# Patient Record
Sex: MALE | Race: WHITE | Employment: FULL TIME | URBAN - METROPOLITAN AREA
[De-identification: names, ages, dates, MRNs, and addresses within clinical notes are randomized per-mention and may not be internally consistent; named-entity substitution may affect disease eponyms.]

---

## 2022-12-02 ENCOUNTER — OFFICE VISIT (OUTPATIENT)
Dept: INTERNAL MEDICINE CLINIC | Age: 47
End: 2022-12-02
Payer: COMMERCIAL

## 2022-12-02 VITALS
WEIGHT: 228.4 LBS | HEIGHT: 74 IN | BODY MASS INDEX: 29.31 KG/M2 | SYSTOLIC BLOOD PRESSURE: 102 MMHG | HEART RATE: 60 BPM | RESPIRATION RATE: 16 BRPM | DIASTOLIC BLOOD PRESSURE: 70 MMHG | OXYGEN SATURATION: 99 % | TEMPERATURE: 98.3 F

## 2022-12-02 DIAGNOSIS — Z12.11 COLON CANCER SCREENING: ICD-10-CM

## 2022-12-02 DIAGNOSIS — Z23 ENCOUNTER FOR IMMUNIZATION: ICD-10-CM

## 2022-12-02 DIAGNOSIS — Z11.59 NEED FOR HEPATITIS C SCREENING TEST: ICD-10-CM

## 2022-12-02 DIAGNOSIS — R12 HEARTBURN: Primary | ICD-10-CM

## 2022-12-02 DIAGNOSIS — Z00.00 ROUTINE PHYSICAL EXAMINATION: ICD-10-CM

## 2022-12-02 NOTE — PROGRESS NOTES
Nadiya Zendejas  is a 56 y.o. male  who present for routine immunizations. Prior to vaccine administration: Consent was obtained. Risks and adverse reactions were discussed. The patient was provided the VIS and they were given an opportunity to ask questions; all questions were addressed. He  denies any symptoms, reactions or allergies that would exclude them from being immunized today. There were no adverse reactions observed post vaccination. Patient was advised to seek medical or call the office with any questions or concerns post vaccination. Patient verbalized understanding.    Mili Barlow LPN

## 2022-12-02 NOTE — PROGRESS NOTES
12/2/2022    Nick Gutierrez Page 1975 is a 56y.o. year old male established patient,   here for evaluation of the following chief complaint(s):  Chief Complaint   Patient presents with    Establish Care    Heartburn           ASSESSMENT/PLAN:  Below is the assessment and plan developed based on review of pertinent history, physical exam, labs, studies, and medications. 1. Heartburn  Assessment & Plan:   Discussed lifestyle recs  Will get Hpylori testing now and then proceed with trial of H2 blockers, if ineffective then move to PPI  Rec that he schedule with GI to discuss having EGD at time of screening colonoscopy  Orders:  -     [address] [place] [address] Drive TEST; Future  2. Colon cancer screening  -     REFERRAL TO GASTROENTEROLOGY  3. Need for hepatitis C screening test  -     HEPATITIS C AB; Future  4. Routine physical examination  -     CBC WITH AUTOMATED DIFF; Future  -     METABOLIC PANEL, COMPREHENSIVE; Future  -     TSH 3RD GENERATION; Future  -     HEMOGLOBIN A1C WITH EAG; Future  -     LIPID PANEL; Future     Labs ordered in anticipation of physical next year    Follow-up and Dispositions    Return in about 3 months (around 3/2/2023) for annual physical, or sooner as needed. SUBJECTIVE/OBJECTIVE:  Heartburn  Some symptoms even back in his youth, had EGD in his 25s he thinks. Symptoms have been worsening last 6 months. He has taken meds off and on over the years. Can wake up from symptoms at night. Taking tums a lot or prilosec PRN occasionally. Feels like diet and exercise habits have been consistent, no obvious aggravating factors. When he stopped drinking for a while he still had reflux       Review of Systems   Constitutional:  Negative for chills and fever. HENT:          No dysphagia   Respiratory:  Negative for cough and shortness of breath. Cardiovascular:  Negative for chest pain, palpitations and leg swelling.    Gastrointestinal:  Positive for heartburn. Negative for abdominal pain. Skin:  Negative for rash. Physical Exam  Constitutional:       General: He is not in acute distress. Appearance: Normal appearance. He is not ill-appearing. HENT:      Head: Normocephalic and atraumatic. Eyes:      Conjunctiva/sclera: Conjunctivae normal.   Cardiovascular:      Rate and Rhythm: Normal rate and regular rhythm. Heart sounds: No murmur heard. Pulmonary:      Effort: Pulmonary effort is normal.      Breath sounds: Normal breath sounds. No wheezing. Musculoskeletal:      Right lower leg: No edema. Left lower leg: No edema. Skin:     General: Skin is warm and dry. Neurological:      General: No focal deficit present. Mental Status: He is alert and oriented to person, place, and time. Mental status is at baseline. Psychiatric:         Mood and Affect: Mood normal.         Thought Content: Thought content normal.         Judgment: Judgment normal.        Vitals:    12/02/22 0956   BP: 102/70   Pulse: 60   Resp: 16   Temp: 98.3 °F (36.8 °C)   TempSrc: Temporal   SpO2: 99%   Weight: 228 lb 6.4 oz (103.6 kg)   Height: 6' 1.6\" (1.869 m)        The following sections were reviewed & updated as appropriate: Problem List, Allergies, PMH, PSH, FH, and SH. Patient Active Problem List   Diagnosis Code    Heartburn R12            Patient has no known allergies. Social History     Occupational History    Not on file   Tobacco Use    Smoking status: Never    Smokeless tobacco: Never    Tobacco comments:     [place] cigar [place] - pretty rare and will use SNUS time to time   Vaping Use    Vaping Use: Never used   Substance and Sexual Activity    Alcohol use:  Yes     Alcohol/week: 7.0 standard drinks     Types: 3 Glasses of wine, 4 Cans of beer per week    Drug use: Never    Sexual activity: Yes     Partners: Female     Birth control/protection: None     Comment: 39 yo  17 years 3 kids no more kids coming            Disclaimer:  Aspects of this note may have been generated using Dragon voice recognition software. Despite editing, there may be some syntax errors   We discussed the expected course, resolution and complications of the diagnosis(es) in detail. I have discussed any significant medication side effects and warnings with the patient when indicated. I advised them to contact the office if their condition worsens, changes or fails to improve as anticipated. Patient expressed understanding of the diagnosis and plan. An electronic signature was used to authenticate this note.   -- Magaly Patel MD

## 2022-12-02 NOTE — ASSESSMENT & PLAN NOTE
Discussed lifestyle recs  Will get Hpylori testing now and then proceed with trial of H2 blockers, if ineffective then move to PPI  Rec that he schedule with GI to discuss having EGD at time of screening colonoscopy

## 2022-12-03 LAB — UREA BREATH TEST QL: NEGATIVE

## 2023-03-13 ENCOUNTER — OFFICE VISIT (OUTPATIENT)
Dept: INTERNAL MEDICINE CLINIC | Age: 48
End: 2023-03-13
Payer: COMMERCIAL

## 2023-03-13 VITALS
BODY MASS INDEX: 30.88 KG/M2 | SYSTOLIC BLOOD PRESSURE: 122 MMHG | TEMPERATURE: 97.3 F | HEIGHT: 73 IN | OXYGEN SATURATION: 98 % | RESPIRATION RATE: 18 BRPM | HEART RATE: 72 BPM | WEIGHT: 233 LBS | DIASTOLIC BLOOD PRESSURE: 82 MMHG

## 2023-03-13 DIAGNOSIS — Z00.00 ROUTINE PHYSICAL EXAMINATION: Primary | ICD-10-CM

## 2023-03-13 DIAGNOSIS — R10.9 LEFT FLANK PAIN: ICD-10-CM

## 2023-03-13 DIAGNOSIS — E78.2 MIXED HYPERLIPIDEMIA: ICD-10-CM

## 2023-03-13 DIAGNOSIS — Z23 NEED FOR TETANUS BOOSTER: ICD-10-CM

## 2023-03-13 DIAGNOSIS — K22.70 BARRETT'S ESOPHAGUS DETERMINED BY BIOPSY: ICD-10-CM

## 2023-03-13 PROCEDURE — 90715 TDAP VACCINE 7 YRS/> IM: CPT | Performed by: INTERNAL MEDICINE

## 2023-03-13 PROCEDURE — 99396 PREV VISIT EST AGE 40-64: CPT | Performed by: INTERNAL MEDICINE

## 2023-03-13 RX ORDER — OMEPRAZOLE 40 MG/1
CAPSULE, DELAYED RELEASE ORAL
COMMUNITY
Start: 2023-01-27

## 2023-03-13 NOTE — PROGRESS NOTES
3/13/2023    Lizabeth Zendejas is a 56 y.o. male who was seen in clinic today for a full physical.             Assessment & Plan:   Below is the assessment and plan developed based on review of pertinent history, physical exam, labs, studies, and medications. 1. Routine physical examination  2. Mixed hyperlipidemia  Assessment & Plan:     Lab Results   Component Value Date/Time    Cholesterol, total 179 02/06/2023 09:53 AM    HDL Cholesterol 37 (L) 02/06/2023 09:53 AM    LDL, calculated 101 (H) 02/06/2023 09:53 AM    VLDL, calculated 41 (H) 02/06/2023 09:53 AM    Triglyceride 238 (H) 02/06/2023 09:53 AM     The 10-year ASCVD risk score (Natalio DK, et al., 2019) is: 2.5%  Discussed lifestyle strategies  3. Millan's esophagus determined by biopsy  Assessment & Plan:   Recent EGD with Dr Wilkins Leventhal, will maintain PPI  4. Need for tetanus booster  -     TDAP, BOOSTRIX, (AGE 10 YRS+), IM  5. Left flank pain  -     URINALYSIS W/MICROSCOPIC; Future   Will get UA to investigate possible kidney stone though more likely MSK back pain     Previsit labs completed, WNL other than lipids    Follow-up and Dispositions    Return for annual physical, or sooner as needed.             Lizabeth Andrade is here today for a full physical.      Additional concerns today: GERD controlled now with PPI    Diet and exercise habits: working out 4-5x per week, cardio and weights  Diet overall balanced    Depression Screen:  3 most recent PHQ Screens 3/13/2023   Little interest or pleasure in doing things Not at all   Feeling down, depressed, irritable, or hopeless Not at all   Total Score PHQ 2 0       Health Maintenance:     Health Maintenance   Topic Date Due    COVID-19 Vaccine (1) Never done    DTaP/Tdap/Td series (1 - Tdap) Never done    Colorectal Cancer Screening Combo  Never done    Depression Screen  03/13/2024    Lipid Screen  02/06/2028    Hepatitis C Screening  Completed    Flu Vaccine  Completed    Pneumococcal 0-64 years  Aged Out Immunization History   Administered Date(s) Administered    Influenza, FLUARIX, FLULAVAL, FLUZONE (age 10 mo+) AND AFLURIA, (age 1 y+), PF, 0.5mL 12/02/2022         Immunizations:   Covid: not up to date - booster  Influenza: up to date  Cancer screening:     Colon: guidelines reviewed, up to date, will request records           Patient Care Team:  Pina Shaver MD as PCP - General (Internal Medicine Physician)  Pina Shaver MD as PCP - [facility]  Arlin Celis MD (Gastroenterology)  Jennifer Mcclelland MD (Orthopedic Surgery)  Troy Harris MD (Dermatology Physician)            The following sections were reviewed & updated as appropriate: Problem List, Allergies, PMH, PSH, FH, and SH. Patient Active Problem List   Diagnosis Code    Millan's esophagus determined by biopsy K22.70    Mixed hyperlipidemia E78.2        Patient has no known allergies. Social History     Occupational History    Not on file   Tobacco Use    Smoking status: Never    Smokeless tobacco: Never    Tobacco comments:     will have a cigar [place] - pretty rare and will use SNUS time to time   Vaping Use    Vaping Use: Never used   Substance and Sexual Activity    Alcohol use: Yes     Alcohol/week: 7.0 standard drinks     Types: 3 Glasses of wine, 4 Cans of beer per week    Drug use: Never    Sexual activity: Yes     Partners: Female     Birth control/protection: None     Comment: 41 yo  17 years 3 kids no more kids coming        Current Outpatient Medications   Medication Instructions    omeprazole (PRILOSEC) 40 mg capsule TAKE 1 CAPSULE BY MOUTH EVERY DAY 1/2 HOUR BEFORE BREAKFAST            Review of Systems   Constitutional:  Negative for chills and fever. Respiratory:  Negative for cough and shortness of breath. Cardiovascular:  Negative for chest pain and palpitations.    Gastrointestinal:  Negative for abdominal pain, blood in stool, constipation, diarrhea, heartburn, nausea and vomiting. Musculoskeletal:  Positive for back pain (mild left lower back) and joint pain (R posterior shoulder). Negative for myalgias. Neurological:  Negative for tingling, focal weakness and headaches. Endo/Heme/Allergies:  Does not bruise/bleed easily. Psychiatric/Behavioral:  Negative for depression. The patient is not nervous/anxious and does not have insomnia. Objective:     Physical Exam  Constitutional:       Appearance: Normal appearance. He is not ill-appearing. HENT:      Head: Normocephalic and atraumatic. Right Ear: Tympanic membrane, ear canal and external ear normal. There is no impacted cerumen. Left Ear: Tympanic membrane, ear canal and external ear normal. There is no impacted cerumen. Nose: Nose normal. No congestion. Mouth/Throat:      Mouth: Mucous membranes are moist.      Pharynx: Oropharynx is clear. No oropharyngeal exudate. Eyes:      Conjunctiva/sclera: Conjunctivae normal.      Pupils: Pupils are equal, round, and reactive to light. Cardiovascular:      Rate and Rhythm: Normal rate and regular rhythm. Heart sounds: No murmur heard. Pulmonary:      Effort: Pulmonary effort is normal. No respiratory distress. Breath sounds: Normal breath sounds. No wheezing. Abdominal:      General: Abdomen is flat. There is no distension. Palpations: Abdomen is soft. Tenderness: There is no abdominal tenderness. Musculoskeletal:      Cervical back: Normal range of motion and neck supple. Right lower leg: No edema. Left lower leg: No edema. Lymphadenopathy:      Cervical: No cervical adenopathy. Skin:     General: Skin is warm. Neurological:      General: No focal deficit present. Mental Status: He is alert and oriented to person, place, and time. Mental status is at baseline. Gait: Gait normal.   Psychiatric:         Mood and Affect: Mood normal.         Thought Content:  Thought content normal. Judgment: Judgment normal.         Vitals:    03/13/23 0804   BP: 122/82   Pulse: 72   Resp: 18   Temp: 97.3 °F (36.3 °C)   TempSrc: Temporal   SpO2: 98%   Weight: 233 lb (105.7 kg)   Height: 6' 1\" (1.854 m)        Disclaimer:  Aspects of this note may have been generated using Dragon voice recognition software. Despite editing, there may be some syntax errors   We discussed the expected course, resolution and complications of the diagnosis(es) in detail. I have discussed any significant medication side effects and warnings with the patient when indicated. I advised them to contact the office if their condition worsens, changes or fails to improve as anticipated. Patient expressed understanding of the diagnosis and plan. An electronic signature was used to authenticate this note.   -- Lucille Delgadillo MD

## 2023-03-13 NOTE — ASSESSMENT & PLAN NOTE
Lab Results   Component Value Date/Time    Cholesterol, total 179 02/06/2023 09:53 AM    HDL Cholesterol 37 (L) 02/06/2023 09:53 AM    LDL, calculated 101 (H) 02/06/2023 09:53 AM    VLDL, calculated 41 (H) 02/06/2023 09:53 AM    Triglyceride 238 (H) 02/06/2023 09:53 AM     The 10-year ASCVD risk score (Natalio CLEMENTS, et al., 2019) is: 2.5%  Discussed lifestyle strategies

## 2023-03-14 LAB
APPEARANCE UR: ABNORMAL
BACTERIA URNS QL MICRO: NEGATIVE /HPF
BILIRUB UR QL: NEGATIVE
COLOR UR: ABNORMAL
EPITH CASTS URNS QL MICRO: ABNORMAL /LPF
GLUCOSE UR STRIP.AUTO-MCNC: NEGATIVE MG/DL
HGB UR QL STRIP: NEGATIVE
HYALINE CASTS URNS QL MICRO: ABNORMAL /LPF (ref 0–5)
KETONES UR QL STRIP.AUTO: ABNORMAL MG/DL
LEUKOCYTE ESTERASE UR QL STRIP.AUTO: NEGATIVE
NITRITE UR QL STRIP.AUTO: NEGATIVE
PH UR STRIP: 5.5 (ref 5–8)
PROT UR STRIP-MCNC: NEGATIVE MG/DL
RBC #/AREA URNS HPF: ABNORMAL /HPF (ref 0–5)
SP GR UR REFRACTOMETRY: 1.02 (ref 1–1.03)
UROBILINOGEN UR QL STRIP.AUTO: 0.2 EU/DL (ref 0.2–1)
WBC URNS QL MICRO: ABNORMAL /HPF (ref 0–4)

## 2023-05-23 RX ORDER — OMEPRAZOLE 40 MG/1
CAPSULE, DELAYED RELEASE ORAL
COMMUNITY
Start: 2023-01-27

## 2024-03-15 ENCOUNTER — TELEPHONE (OUTPATIENT)
Age: 49
End: 2024-03-15

## 2024-03-15 DIAGNOSIS — Z00.00 ROUTINE PHYSICAL EXAMINATION: Primary | ICD-10-CM

## 2024-03-15 NOTE — TELEPHONE ENCOUNTER
Hari Miller  [facility]  Staff4 minutes ago (9:17 AM)     BP  Your information was incorrect. I showed up at 8:15. The  swears it was 816 regardless of one minute they would not see me and I think the service is absolutely terrible. My time is substantially more valuable than the doctors. I hate to say that but it’s true, you can pass this along to the , who was very polite, but still unwilling to accommodate. Especially when you told me if I was less than 20 minutes late, it would be fine 20 minutes or more. I would have to reschedule. I made it within the time you requested. Thus I did not reschedule the meeting yesterday.        You  Hari Miller21 hours ago (11:24 AM)     JH  Good Morning Mr. Miller,     Unfortunately, you would need to reschedule your appointment if you will be 20 mins late for your appointment on 3/15. I can assist with rescheduling your appointment or you can reschedule through [employer].  Please let me know if you will need assistance.      Have a great day!  Perfecot Miller  [facility]  Staff22 hours ago (10:53 AM)     BP  Appointment Request From: Hari Miller     With Provider: Roseanna Astorga MD [[facility]]     Preferred Date Range: 3/14/2024 - 3/14/2024     Preferred Times: Any Time     Reason for visit: Annual Physical     Comments:  physical IS scheduled at 8 AM I will be there ASAP maybe 20 min late kids